# Patient Record
Sex: FEMALE | Race: BLACK OR AFRICAN AMERICAN | NOT HISPANIC OR LATINO
[De-identification: names, ages, dates, MRNs, and addresses within clinical notes are randomized per-mention and may not be internally consistent; named-entity substitution may affect disease eponyms.]

---

## 2019-05-08 ENCOUNTER — RESULT REVIEW (OUTPATIENT)
Age: 34
End: 2019-05-08

## 2021-03-11 ENCOUNTER — RESULT REVIEW (OUTPATIENT)
Age: 36
End: 2021-03-11

## 2021-04-22 PROBLEM — Z00.00 ENCOUNTER FOR PREVENTIVE HEALTH EXAMINATION: Status: ACTIVE | Noted: 2021-04-22

## 2021-05-18 ENCOUNTER — APPOINTMENT (OUTPATIENT)
Dept: ANTEPARTUM | Facility: CLINIC | Age: 36
End: 2021-05-18
Payer: MEDICAID

## 2021-05-18 ENCOUNTER — ASOB RESULT (OUTPATIENT)
Age: 36
End: 2021-05-18

## 2021-05-18 PROCEDURE — 76805 OB US >/= 14 WKS SNGL FETUS: CPT

## 2021-09-13 ENCOUNTER — ASOB RESULT (OUTPATIENT)
Age: 36
End: 2021-09-13

## 2021-09-13 ENCOUNTER — APPOINTMENT (OUTPATIENT)
Dept: ANTEPARTUM | Facility: CLINIC | Age: 36
End: 2021-09-13
Payer: MEDICAID

## 2021-09-13 PROCEDURE — 76816 OB US FOLLOW-UP PER FETUS: CPT

## 2021-09-13 PROCEDURE — 76819 FETAL BIOPHYS PROFIL W/O NST: CPT

## 2021-09-16 ENCOUNTER — INPATIENT (INPATIENT)
Facility: HOSPITAL | Age: 36
LOS: 1 days | Discharge: HOME | End: 2021-09-18
Attending: OBSTETRICS & GYNECOLOGY | Admitting: OBSTETRICS & GYNECOLOGY

## 2021-09-16 VITALS
RESPIRATION RATE: 18 BRPM | TEMPERATURE: 98 F | SYSTOLIC BLOOD PRESSURE: 140 MMHG | DIASTOLIC BLOOD PRESSURE: 72 MMHG | HEART RATE: 67 BPM

## 2021-09-16 DIAGNOSIS — O26.899 OTHER SPECIFIED PREGNANCY RELATED CONDITIONS, UNSPECIFIED TRIMESTER: ICD-10-CM

## 2021-09-16 DIAGNOSIS — Z3A.00 WEEKS OF GESTATION OF PREGNANCY NOT SPECIFIED: ICD-10-CM

## 2021-09-16 LAB
BASOPHILS # BLD AUTO: 0.01 K/UL — SIGNIFICANT CHANGE UP (ref 0–0.2)
BASOPHILS NFR BLD AUTO: 0.1 % — SIGNIFICANT CHANGE UP (ref 0–2)
BLD GP AB SCN SERPL QL: NEGATIVE — SIGNIFICANT CHANGE UP
EOSINOPHIL # BLD AUTO: 0 K/UL — SIGNIFICANT CHANGE UP (ref 0–0.5)
EOSINOPHIL NFR BLD AUTO: 0 % — SIGNIFICANT CHANGE UP (ref 0–6)
HCT VFR BLD CALC: 38.5 % — SIGNIFICANT CHANGE UP (ref 34.5–45)
HGB BLD-MCNC: 13 G/DL — SIGNIFICANT CHANGE UP (ref 11.5–15.5)
IANC: 8.36 K/UL — SIGNIFICANT CHANGE UP (ref 1.5–8.5)
IMM GRANULOCYTES NFR BLD AUTO: 0.6 % — SIGNIFICANT CHANGE UP (ref 0–1.5)
LYMPHOCYTES # BLD AUTO: 1.22 K/UL — SIGNIFICANT CHANGE UP (ref 1–3.3)
LYMPHOCYTES # BLD AUTO: 11.9 % — LOW (ref 13–44)
MCHC RBC-ENTMCNC: 32.9 PG — SIGNIFICANT CHANGE UP (ref 27–34)
MCHC RBC-ENTMCNC: 33.8 GM/DL — SIGNIFICANT CHANGE UP (ref 32–36)
MCV RBC AUTO: 97.5 FL — SIGNIFICANT CHANGE UP (ref 80–100)
MONOCYTES # BLD AUTO: 0.64 K/UL — SIGNIFICANT CHANGE UP (ref 0–0.9)
MONOCYTES NFR BLD AUTO: 6.2 % — SIGNIFICANT CHANGE UP (ref 2–14)
NEUTROPHILS # BLD AUTO: 8.36 K/UL — HIGH (ref 1.8–7.4)
NEUTROPHILS NFR BLD AUTO: 81.2 % — HIGH (ref 43–77)
NRBC # BLD: 0 /100 WBCS — SIGNIFICANT CHANGE UP
NRBC # FLD: 0 K/UL — SIGNIFICANT CHANGE UP
PLATELET # BLD AUTO: 271 K/UL — SIGNIFICANT CHANGE UP (ref 150–400)
RBC # BLD: 3.95 M/UL — SIGNIFICANT CHANGE UP (ref 3.8–5.2)
RBC # FLD: 13 % — SIGNIFICANT CHANGE UP (ref 10.3–14.5)
RH IG SCN BLD-IMP: POSITIVE — SIGNIFICANT CHANGE UP
RH IG SCN BLD-IMP: POSITIVE — SIGNIFICANT CHANGE UP
SARS-COV-2 RNA SPEC QL NAA+PROBE: SIGNIFICANT CHANGE UP
WBC # BLD: 10.29 K/UL — SIGNIFICANT CHANGE UP (ref 3.8–10.5)
WBC # FLD AUTO: 10.29 K/UL — SIGNIFICANT CHANGE UP (ref 3.8–10.5)

## 2021-09-16 RX ORDER — AMPICILLIN TRIHYDRATE 250 MG
1 CAPSULE ORAL EVERY 4 HOURS
Refills: 0 | Status: DISCONTINUED | OUTPATIENT
Start: 2021-09-16 | End: 2021-09-16

## 2021-09-16 RX ORDER — IBUPROFEN 200 MG
600 TABLET ORAL EVERY 6 HOURS
Refills: 0 | Status: COMPLETED | OUTPATIENT
Start: 2021-09-16 | End: 2022-08-15

## 2021-09-16 RX ORDER — OXYCODONE HYDROCHLORIDE 5 MG/1
5 TABLET ORAL
Refills: 0 | Status: DISCONTINUED | OUTPATIENT
Start: 2021-09-16 | End: 2021-09-18

## 2021-09-16 RX ORDER — DIBUCAINE 1 %
1 OINTMENT (GRAM) RECTAL EVERY 6 HOURS
Refills: 0 | Status: DISCONTINUED | OUTPATIENT
Start: 2021-09-16 | End: 2021-09-18

## 2021-09-16 RX ORDER — SODIUM CHLORIDE 9 MG/ML
1000 INJECTION, SOLUTION INTRAVENOUS
Refills: 0 | Status: DISCONTINUED | OUTPATIENT
Start: 2021-09-16 | End: 2021-09-16

## 2021-09-16 RX ORDER — AER TRAVELER 0.5 G/1
1 SOLUTION RECTAL; TOPICAL EVERY 4 HOURS
Refills: 0 | Status: DISCONTINUED | OUTPATIENT
Start: 2021-09-16 | End: 2021-09-18

## 2021-09-16 RX ORDER — BENZOCAINE 10 %
1 GEL (GRAM) MUCOUS MEMBRANE EVERY 6 HOURS
Refills: 0 | Status: DISCONTINUED | OUTPATIENT
Start: 2021-09-16 | End: 2021-09-18

## 2021-09-16 RX ORDER — MAGNESIUM HYDROXIDE 400 MG/1
30 TABLET, CHEWABLE ORAL
Refills: 0 | Status: DISCONTINUED | OUTPATIENT
Start: 2021-09-16 | End: 2021-09-18

## 2021-09-16 RX ORDER — SODIUM CHLORIDE 9 MG/ML
3 INJECTION INTRAMUSCULAR; INTRAVENOUS; SUBCUTANEOUS EVERY 8 HOURS
Refills: 0 | Status: DISCONTINUED | OUTPATIENT
Start: 2021-09-16 | End: 2021-09-18

## 2021-09-16 RX ORDER — ACETAMINOPHEN 500 MG
975 TABLET ORAL
Refills: 0 | Status: DISCONTINUED | OUTPATIENT
Start: 2021-09-16 | End: 2021-09-18

## 2021-09-16 RX ORDER — OXYTOCIN 10 UNIT/ML
333.33 VIAL (ML) INJECTION
Qty: 20 | Refills: 0 | Status: DISCONTINUED | OUTPATIENT
Start: 2021-09-16 | End: 2021-09-17

## 2021-09-16 RX ORDER — SIMETHICONE 80 MG/1
80 TABLET, CHEWABLE ORAL EVERY 4 HOURS
Refills: 0 | Status: DISCONTINUED | OUTPATIENT
Start: 2021-09-16 | End: 2021-09-18

## 2021-09-16 RX ORDER — TETANUS TOXOID, REDUCED DIPHTHERIA TOXOID AND ACELLULAR PERTUSSIS VACCINE, ADSORBED 5; 2.5; 8; 8; 2.5 [IU]/.5ML; [IU]/.5ML; UG/.5ML; UG/.5ML; UG/.5ML
0.5 SUSPENSION INTRAMUSCULAR ONCE
Refills: 0 | Status: DISCONTINUED | OUTPATIENT
Start: 2021-09-16 | End: 2021-09-18

## 2021-09-16 RX ORDER — OXYTOCIN 10 UNIT/ML
333.33 VIAL (ML) INJECTION
Qty: 20 | Refills: 0 | Status: DISCONTINUED | OUTPATIENT
Start: 2021-09-16 | End: 2021-09-16

## 2021-09-16 RX ORDER — OXYCODONE HYDROCHLORIDE 5 MG/1
5 TABLET ORAL ONCE
Refills: 0 | Status: DISCONTINUED | OUTPATIENT
Start: 2021-09-16 | End: 2021-09-18

## 2021-09-16 RX ORDER — DIPHENHYDRAMINE HCL 50 MG
25 CAPSULE ORAL EVERY 6 HOURS
Refills: 0 | Status: DISCONTINUED | OUTPATIENT
Start: 2021-09-16 | End: 2021-09-18

## 2021-09-16 RX ORDER — AMPICILLIN TRIHYDRATE 250 MG
2 CAPSULE ORAL ONCE
Refills: 0 | Status: COMPLETED | OUTPATIENT
Start: 2021-09-16 | End: 2021-09-16

## 2021-09-16 RX ORDER — HYDROCORTISONE 1 %
1 OINTMENT (GRAM) TOPICAL EVERY 6 HOURS
Refills: 0 | Status: DISCONTINUED | OUTPATIENT
Start: 2021-09-16 | End: 2021-09-18

## 2021-09-16 RX ORDER — KETOROLAC TROMETHAMINE 30 MG/ML
30 SYRINGE (ML) INJECTION ONCE
Refills: 0 | Status: DISCONTINUED | OUTPATIENT
Start: 2021-09-16 | End: 2021-09-16

## 2021-09-16 RX ORDER — PRAMOXINE HYDROCHLORIDE 150 MG/15G
1 AEROSOL, FOAM RECTAL EVERY 4 HOURS
Refills: 0 | Status: DISCONTINUED | OUTPATIENT
Start: 2021-09-16 | End: 2021-09-18

## 2021-09-16 RX ORDER — LANOLIN
1 OINTMENT (GRAM) TOPICAL EVERY 6 HOURS
Refills: 0 | Status: DISCONTINUED | OUTPATIENT
Start: 2021-09-16 | End: 2021-09-18

## 2021-09-16 RX ADMIN — SODIUM CHLORIDE 3 MILLILITER(S): 9 INJECTION INTRAMUSCULAR; INTRAVENOUS; SUBCUTANEOUS at 21:10

## 2021-09-16 RX ADMIN — Medication 1000 MILLIUNIT(S)/MIN: at 17:05

## 2021-09-16 RX ADMIN — Medication 216 GRAM(S): at 15:49

## 2021-09-16 RX ADMIN — SODIUM CHLORIDE 125 MILLILITER(S): 9 INJECTION, SOLUTION INTRAVENOUS at 15:49

## 2021-09-16 RX ADMIN — Medication 30 MILLIGRAM(S): at 23:42

## 2021-09-16 NOTE — OB PROVIDER H&P - HISTORY OF PRESENT ILLNESS
37 y/o  at 38.3 weeks gestation c/o painful ctx q2 mins with bloody discharge. Denies lof. Reports good FM.    AP course uncomplicated. GBS +, per patient  NKDA  Current medications:  -PNV  Denies OBGYN history  Denies medical history  Denies surgical history

## 2021-09-16 NOTE — OB PROVIDER DELIVERY SUMMARY - NSLACERATRAPAIRDETAILS_OBGYN_ALL_OB_FT
1st degree laceration repaired with chromic suture with good hemostasis and reapproximation, periurethral tears superficial and hemostatic

## 2021-09-16 NOTE — OB RN DELIVERY SUMMARY - NS_SEPSISRSKCALC_OBGYN_ALL_OB_FT
EOS calculated successfully. EOS Risk Factor: 0.5/1000 live births ([facility] national incidence); GA=38w3d; Temp=97.7; ROM=0.333; GBS='Positive'; Antibiotics='No antibiotics or any antibiotics < 2 hrs prior to birth'

## 2021-09-16 NOTE — OB RN PATIENT PROFILE - NS_ADMITDT_OBGYN_ALL_OB_DT
Post-Care Instructions: I reviewed with the patient in detail post-care instructions. Patient is not to engage in any heavy lifting, exercise, or swimming for the next 14 days. Should the patient develop any fevers, chills, bleeding, severe pain patient will contact the office immediately. 16-Sep-2021 17:49

## 2021-09-16 NOTE — OB RN PATIENT PROFILE - NS PRO PT RIGHT SUPPORT PERSON
From: Roz Rizvi  To: Serena Shields MD  Sent: 5/9/2018 2:27 PM CDT  Subject: Belkys blood pressure follow up    This message is being sent by Lizandro Rico on behalf of oRz Rizvi    Roz's systolic pressure is still falling when she stands up; anywhere from 9 to 27 points.  For instance today she was at 148/45 sitting, and 121/47 standing.  On May 7th she was 166/50 sitting and 144/52 standing.   She never complains about being dizzy or being light headed  Her weight has been consistent at 156.  Lizandro  
Please see message from pt.   
desiree message sent by Dr Gabriel Bone read the message.  
Declines

## 2021-09-16 NOTE — OB RN DELIVERY SUMMARY - NSSELHIDDEN_OBGYN_ALL_OB_FT
[NS_DeliveryAttending1_OBGYN_ALL_OB_FT:[id number]==],[NS_DeliveryRN_OBGYN_ALL_OB_FT:[id number]==]

## 2021-09-16 NOTE — OB RN PATIENT PROFILE - URINARY CATHETER
----- Message from Susannah Adame sent at 11/1/2017 11:44 AM CDT -----  Contact: nico-daughter  Would like to consult with nurse regarding increase in dosage on depression medication,back brace being ordered, if possible can she be admitted to a rehab hospital,and home health request. Please call back [phone].        Thanks,  Susannah Adame     no

## 2021-09-16 NOTE — OB PROVIDER H&P - PROBLEM SELECTOR PLAN 1
pt admitted in active labor  d/w MD Carmona, en route to hospital  routine lab orders  Ampicillin for GBS prophylaxis  consents obtained  COVID PCR sent on patient and partner  Prenatal records to be faxed to triage

## 2021-09-16 NOTE — OB PROVIDER DELIVERY SUMMARY - NSPROVIDERDELIVERYNOTE_OBGYN_ALL_OB_FT
Patient is feeling rectal pressure and pain with contractions. On exam 10/100/+1, vtx. Patient positioned and instructed to push with contractions. With maternal pushing, fetal head delivered in OA position and restituted. Nuchal cord noted tightx1, clamped and cut. Body delivered easily with maternal pushing. Viable baby boy placed on maternal abdomen and suctioned. Cord blood obtained. Placenta delivered spontaneously, intact with 3  vessel cord. APGARS  8/9. 1st degree laceration repaired with chromic suture with good hemostasis and reapproximation, periurethral tears superficial and hemostatic. EBL  200 cc. Baby and mother in good condition recovering in the room.  MD travis

## 2021-09-16 NOTE — OB PROVIDER TRIAGE NOTE - HISTORY OF PRESENT ILLNESS
35 y/o  at 38.3 weeks gestation c/o painful ctx q2 mins with bloody discharge. Denies lof. Reports good FM.    AP course uncomplicated. GBS +, per patient  NKDA  Current medications:  -PNV  Denies OBGYN history  Denies medical history  Denies surgical history

## 2021-09-16 NOTE — OB PROVIDER TRIAGE NOTE - NSHPPHYSICALEXAM_GEN_ALL_CORE
SVE: 9/100/-1 bulging bag  abdomen: gravid, soft, nontender  TAS: cephalic presentation  EFW: 2948 grams  FHT: category 1 tracing  TOCO: strong ctx q1-3 mins    Vital Signs Last 24 Hrs  T(C): 36.5 (16 Sep 2021 15:19), Max: 36.5 (16 Sep 2021 14:11)  T(F): 97.7 (16 Sep 2021 15:19), Max: 97.7 (16 Sep 2021 14:11)  HR: 67 (16 Sep 2021 15:21) (67 - 67)  BP: 140/72 (16 Sep 2021 15:21) (140/72 - 140/72)  BP(mean): --  RR: 18 (16 Sep 2021 15:19) (18 - 18)  SpO2: --

## 2021-09-16 NOTE — OB PROVIDER TRIAGE NOTE - NSOBPROVIDERNOTE_OBGYN_ALL_OB_FT
pt admitted in active labor  d/w MD Carmona  routine lab orders  Ampicillin for GBS prophylaxis  consents obtained  COVID PCR sent on patient and partner  Prenatal records to be faxed to triage

## 2021-09-17 ENCOUNTER — TRANSCRIPTION ENCOUNTER (OUTPATIENT)
Age: 36
End: 2021-09-17

## 2021-09-17 LAB
COVID-19 SPIKE DOMAIN AB INTERP: POSITIVE
COVID-19 SPIKE DOMAIN ANTIBODY RESULT: 33.7 U/ML — HIGH
SARS-COV-2 IGG+IGM SERPL QL IA: 33.7 U/ML — HIGH
SARS-COV-2 IGG+IGM SERPL QL IA: POSITIVE
T PALLIDUM AB TITR SER: NEGATIVE — SIGNIFICANT CHANGE UP

## 2021-09-17 RX ORDER — IBUPROFEN 200 MG
600 TABLET ORAL EVERY 6 HOURS
Refills: 0 | Status: DISCONTINUED | OUTPATIENT
Start: 2021-09-17 | End: 2021-09-18

## 2021-09-17 RX ORDER — ACETAMINOPHEN 500 MG
3 TABLET ORAL
Qty: 0 | Refills: 0 | DISCHARGE
Start: 2021-09-17

## 2021-09-17 RX ORDER — IBUPROFEN 200 MG
1 TABLET ORAL
Qty: 0 | Refills: 0 | DISCHARGE
Start: 2021-09-17

## 2021-09-17 RX ADMIN — Medication 600 MILLIGRAM(S): at 23:17

## 2021-09-17 RX ADMIN — Medication 975 MILLIGRAM(S): at 03:06

## 2021-09-17 RX ADMIN — Medication 30 MILLIGRAM(S): at 01:30

## 2021-09-17 RX ADMIN — SODIUM CHLORIDE 3 MILLILITER(S): 9 INJECTION INTRAMUSCULAR; INTRAVENOUS; SUBCUTANEOUS at 06:20

## 2021-09-17 RX ADMIN — SODIUM CHLORIDE 3 MILLILITER(S): 9 INJECTION INTRAMUSCULAR; INTRAVENOUS; SUBCUTANEOUS at 15:03

## 2021-09-17 RX ADMIN — Medication 975 MILLIGRAM(S): at 03:36

## 2021-09-17 RX ADMIN — Medication 600 MILLIGRAM(S): at 19:45

## 2021-09-17 RX ADMIN — Medication 600 MILLIGRAM(S): at 18:03

## 2021-09-17 NOTE — LACTATION INITIAL EVALUATION - INTERVENTION OUTCOME
Continued assessment and assistance needed at this time./verbalizes understanding/demonstrates understanding of teaching/good return demonstration/needs not met/Lactation team to follow up

## 2021-09-17 NOTE — DISCHARGE NOTE OB - MEDICATION SUMMARY - MEDICATIONS TO TAKE
I will START or STAY ON the medications listed below when I get home from the hospital:    acetaminophen 325 mg oral tablet  -- 3 tab(s) by mouth 3 times a day, As Needed  -- Indication: For pain    ibuprofen 600 mg oral tablet  -- 1 tab(s) by mouth every 6 hours, As Needed  -- Indication: For pain

## 2021-09-17 NOTE — DISCHARGE NOTE OB - CARE PROVIDER_API CALL
Marj Baker)  Obstetrics and Gynecology  [address] [place] [id number]  Phone: [phone]  Fax: [phone]  Follow Up Time:

## 2021-09-17 NOTE — DISCHARGE NOTE OB - PLAN OF CARE
Regular diet, activities as tolerated, nothing per vagina, follow up with obstetrician for postpartum visit in 4-6 weeks

## 2021-09-17 NOTE — DISCHARGE NOTE OB - PATIENT PORTAL LINK FT
You can access the FollowMyHealth Patient Portal offered by [facility] by registering at the following website: [email or web address]. By joining [facility]’s FollowMyHealth portal, you will also be able to view your health information using other applications (apps) compatible with our system.

## 2021-09-17 NOTE — PROGRESS NOTE ADULT - SUBJECTIVE AND OBJECTIVE BOX
46 y/o P1 s/p  PPD#1  pt ambulating, voiding  reports minimal pain and minimal lochia    vitals  /72 P 79 RR 16 T 98.2 O2sat 100%RA  cardio ns1s2  lungs ctab  abdomen firm/non tender uterus at umbilical level  lext +3 edema bilat (since antepartum) , non tender bilat    admission CBC  wbc 8, h/h 13/38, ptl 271    A/P  37 y/o P1 s/p  PPD#1 stable  cont post partum care  cont reg diet  cont PO pain mgnt  plan for d/c home PPD#2

## 2021-09-17 NOTE — LACTATION INITIAL EVALUATION - LACTATION INTERVENTIONS
Discussed triple feeding instructions.  Primary care RN made aware of observations, consult and plan./initiate/review safe skin-to-skin/initiate/review hand expression/initiate/review pumping guidelines and safe milk handling/initiate/review techniques for position and latch/post discharge community resources provided/initiate/review nipple shield use/review techniques to increase milk supply/initiate/review finger suck/initiate/review breast massage/compression/initiate/review alternate feeding method/reviewed components of an effective feeding and at least 8 effective feedings per day required/reviewed importance of monitoring infant diapers, the breastfeeding log, and minimum output each day/reviewed strategies to transition to breastfeeding only/reviewed benefits and recommendations for rooming in/reviewed feeding on demand/by cue at least 8 times a day/reviewed indications of inadequate milk transfer that would require supplementation

## 2021-09-17 NOTE — DISCHARGE NOTE OB - NSTOBACCONEVERSMOKERY/N_GEN_A
Norco 7.5/325 mg po given for face pain rated 6/10  Xanax 0.25 mg po given for increased anxiety    2234: Pain rated 4/10 No

## 2021-09-17 NOTE — DISCHARGE NOTE OB - CARE PLAN
Principal Discharge DX:	Normal vaginal delivery  Assessment and plan of treatment:	Regular diet, activities as tolerated, nothing per vagina, follow up with obstetrician for postpartum visit in 4-6 weeks   1

## 2021-09-17 NOTE — DISCHARGE NOTE OB - HOSPITAL COURSE
Patient admitted in active labor. She underwent uncomplicated vaginal delivery of a viable boy. Her hospital course was uneventful. Patient was discharged home and instructed to follow up with obstetrician

## 2021-09-18 VITALS
SYSTOLIC BLOOD PRESSURE: 105 MMHG | HEART RATE: 77 BPM | DIASTOLIC BLOOD PRESSURE: 66 MMHG | RESPIRATION RATE: 18 BRPM | TEMPERATURE: 98 F | OXYGEN SATURATION: 99 %

## 2021-09-18 RX ADMIN — SODIUM CHLORIDE 3 MILLILITER(S): 9 INJECTION INTRAMUSCULAR; INTRAVENOUS; SUBCUTANEOUS at 16:40

## 2021-09-18 RX ADMIN — Medication 600 MILLIGRAM(S): at 05:27

## 2021-09-18 RX ADMIN — Medication 600 MILLIGRAM(S): at 00:00

## 2021-09-18 NOTE — PROGRESS NOTE ADULT - SUBJECTIVE AND OBJECTIVE BOX
PPD #2  Patient evaluated at bedside.   Patient's pain is well controlled   Patient denies headache, dizziness, chest pain, palpitations, shortness of breath, nausea, vomiting, heavy vaginal bleeding or perineal discomfort.  Patient has been ambulating without assistance, voiding spontaneously, tolerating diet    Physical Exam:  Vital Signs Last 24 Hrs  T(C): 36.8 (18 Sep 2021 06:53), Max: 36.8 (17 Sep 2021 17:51)  T(F): 98.2 (18 Sep 2021 06:53), Max: 98.2 (17 Sep 2021 17:51)  HR: 65 (18 Sep 2021 06:53) (65 - 79)  BP: 115/68 (18 Sep 2021 06:53) (114/72 - 115/68)  BP(mean): --  RR: 17 (18 Sep 2021 06:53) (16 - 17)  SpO2: 99% (18 Sep 2021 06:53) (99% - 100%)  I&O's Summary    17 Sep 2021 07:01  -  18 Sep 2021 07:00  --------------------------------------------------------  IN: 0 mL / OUT: 600 mL / NET: -600 mL      NAD, A+0 x 3    lochia WNL                        13.0   10.29 )-----------( 271      ( 16 Sep 2021 16:02 )             38.5                 Assessment:  stable postpartum    Plan:  encourage ambulation and po fluids  Encourage breastfeeding  discharge home

## 2021-10-04 ENCOUNTER — NON-APPOINTMENT (OUTPATIENT)
Age: 36
End: 2021-10-04

## 2023-04-06 NOTE — OB RN DELIVERY SUMMARY - BABY A: APGAR 5 MIN HEART RATE, DELIVERY
Received RX Auth from [facility]- refill on metoprolol XL 50 mg # 90 with 1 refill sent to pharmacy per protocol.  Last seen 12/15/22.  Next appointment scheduled 6/22/23.  Last blood pressure done at office visit dated 12/15/22 was 136/74.      Beta Blocker Refill Protocol - 12 Month Protocol Failed 04/05/2023 05:28 AM   Protocol Details  Last BP was under 140/90 or if patient has diabetes, CAD, or PVD, BP under 130/80 in           (2) more than 100 beats/min

## 2024-01-30 NOTE — OB PROVIDER DELIVERY SUMMARY - AMNIOTIC FLUID AMOUNT, LABOR
Quality 226: Preventive Care And Screening: Tobacco Use: Screening And Cessation Intervention: Patient screened for tobacco use and is an ex/non-smoker
Quality 130: Documentation Of Current Medications In The Medical Record: Current Medications Documented
Detail Level: Detailed
Quality 431: Preventive Care And Screening: Unhealthy Alcohol Use - Screening: Patient not identified as an unhealthy alcohol user when screened for unhealthy alcohol use using a systematic screening method
within normal limits